# Patient Record
Sex: MALE | Race: OTHER | NOT HISPANIC OR LATINO
[De-identification: names, ages, dates, MRNs, and addresses within clinical notes are randomized per-mention and may not be internally consistent; named-entity substitution may affect disease eponyms.]

---

## 2021-06-24 ENCOUNTER — APPOINTMENT (OUTPATIENT)
Dept: OTOLARYNGOLOGY | Facility: CLINIC | Age: 31
End: 2021-06-24

## 2022-01-07 ENCOUNTER — OUTPATIENT (OUTPATIENT)
Dept: OUTPATIENT SERVICES | Facility: HOSPITAL | Age: 32
LOS: 1 days | End: 2022-01-07
Payer: MEDICAID

## 2022-01-07 DIAGNOSIS — Z11.52 ENCOUNTER FOR SCREENING FOR COVID-19: ICD-10-CM

## 2022-01-07 PROCEDURE — U0003: CPT

## 2022-01-07 PROCEDURE — U0005: CPT

## 2022-01-07 PROCEDURE — C9803: CPT

## 2022-01-08 LAB — SARS-COV-2 RNA SPEC QL NAA+PROBE: SIGNIFICANT CHANGE UP

## 2023-02-06 ENCOUNTER — APPOINTMENT (OUTPATIENT)
Dept: ORTHOPEDIC SURGERY | Facility: CLINIC | Age: 33
End: 2023-02-06
Payer: MEDICAID

## 2023-02-06 VITALS
TEMPERATURE: 96.8 F | BODY MASS INDEX: 25.01 KG/M2 | WEIGHT: 165 LBS | OXYGEN SATURATION: 98 % | HEART RATE: 89 BPM | SYSTOLIC BLOOD PRESSURE: 116 MMHG | DIASTOLIC BLOOD PRESSURE: 75 MMHG | HEIGHT: 68 IN

## 2023-02-06 PROBLEM — Z00.00 ENCOUNTER FOR PREVENTIVE HEALTH EXAMINATION: Status: ACTIVE | Noted: 2023-02-06

## 2023-02-06 PROCEDURE — 99204 OFFICE O/P NEW MOD 45 MIN: CPT | Mod: 25

## 2023-02-06 PROCEDURE — 29130 APPL FINGER SPLINT STATIC: CPT | Mod: LT

## 2023-02-06 PROCEDURE — 73140 X-RAY EXAM OF FINGER(S): CPT | Mod: LT

## 2023-02-06 NOTE — PROCEDURE
[FreeTextEntry1] : Left finger splint molded and applied\par Post-reduction XR with improved joint congruence and alignment

## 2023-02-06 NOTE — PHYSICAL EXAM
[de-identified] : Gen: NAD\par RSP: Nonlabored\par MSK: MAIAE was seen and examined\par Small finger with radial deviation and flexion deformity with 25 degree extensor lag\par No open lacerations or abrasions\par SILT throughout\par Able to make full composite fist, but extension lag as above\par Of note patient hyperextends at the PIPJ and this is present bilaterally\par 2+ radial pulse [de-identified] : XR L Hand: 3 V from OSH demonstrate an intra-articular fracture at the dorsal aspect of the distal phalanx with proximal retraction\par \par XR L Finger: 2V in splint demonstrate a reduced joint surface in full extension without reduction of fracture fragment

## 2023-02-06 NOTE — ASSESSMENT
[FreeTextEntry1] : 32 year-old M with L small finger bony mallet s/p reduction in splint\par \par Plan:\par Recommended CRPP L small finger to maintain reduction at bony mallet\par Patient was reluctant for surgery and was counseled as to 24/7 splinting for 6 weeks to nonoperatively treat mallet finger - that this is generally my tx for soft tissue mallets but should he not consent to surgery would recommend this course of action\par OT for custom mallet splints\par F/u 2 weeks for XR in new splint

## 2023-02-10 DIAGNOSIS — M20.019 MALLET FINGER OF UNSPECIFIED FINGER(S): ICD-10-CM
